# Patient Record
Sex: FEMALE | URBAN - METROPOLITAN AREA
[De-identification: names, ages, dates, MRNs, and addresses within clinical notes are randomized per-mention and may not be internally consistent; named-entity substitution may affect disease eponyms.]

---

## 2023-08-29 LAB
ESTIMATED AVERAGE GLUCOSE: NORMAL
HBA1C MFR BLD: 6.1 %

## 2024-01-26 ENCOUNTER — CLINICAL DOCUMENTATION (OUTPATIENT)
Dept: PHARMACY | Facility: CLINIC | Age: 59
End: 2024-01-26

## 2024-01-26 NOTE — PROGRESS NOTES
Pharmacy Pop Care Documentation:   Patient is missing the following requirements: DX: DM Type One or Type Two; Provider Documentation of DM Visit; A1C    If completed by 02/25/24 patient will be eligible for enrollment in the DM Program on 03/01/24.    Application Received: 1/26/24  Application scanned.    Letter mailed to patient.      Deondre Alba [facility]  Clinical Pharmacy   Phone: [phone]       For Pharmacy Admin Tracking Only    Program: [facility]  CPA in place:  No  Recommendation Provided To: Patient/Caregiver: 1 via Telephone  Intervention Detail: Scheduled Appointment  Intervention Accepted By: Patient/Caregiver: 1  Gap Closed?: Yes   Time Spent (min): 10

## 2024-02-06 ENCOUNTER — CLINICAL DOCUMENTATION (OUTPATIENT)
Dept: PHARMACY | Facility: CLINIC | Age: 59
End: 2024-02-06

## 2024-02-06 NOTE — PROGRESS NOTES
11/21/23: Brenda Zapata per scanned document.      Deondre Alba [facility]  Clinical Pharmacy   Phone: [phone]       For Pharmacy Admin Tracking Only    Program: [employer]  CPA in place:  No  Gap Closed?: Yes   Time Spent (min): 5

## 2024-03-01 ENCOUNTER — TELEPHONE (OUTPATIENT)
Dept: PHARMACY | Facility: CLINIC | Age: 59
End: 2024-03-01

## 2024-03-01 NOTE — TELEPHONE ENCOUNTER
**Patient is [employer]**     2024 Annual Pharmacist Visit    Called patient to schedule 2024 yearly pharmacist appointment to discuss medications for Diabetes Management Program.    Spoke to patient and appointment scheduled for 3/5/24 at 12:00pm CST (1:00pm EST)           Deondre Alba [facility]  Clinical Pharmacy   Phone: [phone], option #3       For Pharmacy Admin Tracking Only    Program: [employer]  CPA in place:  No  Recommendation Provided To: Patient/Caregiver: 1 via Telephone  Intervention Detail: Scheduled Appointment  Intervention Accepted By: Patient/Caregiver: 1  Gap Closed?: Yes   Time Spent (min): 5

## 2024-03-05 ENCOUNTER — TELEPHONE (OUTPATIENT)
Dept: PHARMACY | Facility: CLINIC | Age: 59
End: 2024-03-05

## 2024-03-05 LAB
ESTIMATED AVERAGE GLUCOSE: NORMAL
HBA1C MFR BLD: 6.3 %

## 2024-03-05 RX ORDER — BUPROPION HYDROCHLORIDE 75 MG/1
TABLET ORAL
COMMUNITY
Start: 2024-01-02

## 2024-03-05 RX ORDER — GABAPENTIN 300 MG/1
CAPSULE ORAL
COMMUNITY
Start: 2023-10-23

## 2024-03-05 RX ORDER — PANTOPRAZOLE SODIUM 40 MG/1
40 TABLET, DELAYED RELEASE ORAL DAILY
COMMUNITY
Start: 2023-04-06

## 2024-03-05 RX ORDER — ATORVASTATIN CALCIUM 40 MG/1
TABLET, FILM COATED ORAL
COMMUNITY
Start: 2023-09-26

## 2024-03-05 RX ORDER — AMLODIPINE BESYLATE 5 MG/1
TABLET ORAL
COMMUNITY
Start: 2023-09-26

## 2024-03-05 RX ORDER — ALPRAZOLAM 1 MG/1
TABLET ORAL
COMMUNITY
Start: 2024-02-14

## 2024-03-05 RX ORDER — LISINOPRIL 40 MG/1
TABLET ORAL
COMMUNITY
Start: 2023-10-24

## 2024-03-05 RX ORDER — TIRZEPATIDE 10 MG/.5ML
10 INJECTION, SOLUTION SUBCUTANEOUS
COMMUNITY
Start: 2023-09-03

## 2024-03-05 RX ORDER — LEVOTHYROXINE SODIUM 112 UG/1
112 TABLET ORAL EVERY MORNING
COMMUNITY
Start: 2024-01-04

## 2024-03-05 NOTE — TELEPHONE ENCOUNTER
[facility] CLINICAL PHARMACY REVIEW -LIVE WELL WITH DIABETES ([facility])  =================================================================  Jenny Braxton is a 59 y.o. female enrolled in the [facility] Live Well with Diabetes program.    Two attempts made to reach patient by telephone for pharmacist appointment. Left voice message for patient to return clinician's phone call to [phone], option 3.     Called multiple times between 1-2pm EST.  Phone went straight to  the first time, but I did get it to ring around 140.  Eventually went to .  Advised pt to call us back today and if I am available I will complete appt.    Of note, pt in [place] and cannot use [facility]    WAly Daily, PharmD, BCACP  Ambulatory Care Clinical Pharmacist- [facility] Clinical Pharmacy  Department, toll free: [phone]       For Pharmacy Admin Tracking Only    Program: [facility]  CPA in place:  No  Gap Closed?: No   Time Spent (min): 15

## 2024-05-08 ENCOUNTER — TELEPHONE (OUTPATIENT)
Dept: PHARMACY | Facility: CLINIC | Age: 59
End: 2024-05-08

## 2024-05-08 RX ORDER — ONDANSETRON 8 MG/1
8 TABLET, ORALLY DISINTEGRATING ORAL EVERY 8 HOURS PRN
COMMUNITY
Start: 2024-01-09

## 2024-05-08 RX ORDER — TIZANIDINE HYDROCHLORIDE 4 MG/1
4 CAPSULE, GELATIN COATED ORAL 3 TIMES DAILY
COMMUNITY
Start: 2023-11-06

## 2024-05-08 RX ORDER — CYANOCOBALAMIN 1000 UG/ML
1000 INJECTION, SOLUTION INTRAMUSCULAR; SUBCUTANEOUS ONCE
COMMUNITY
Start: 2024-03-15

## 2024-05-08 RX ORDER — UBIDECARENONE 100 MG
100 CAPSULE ORAL DAILY
COMMUNITY
Start: 2015-06-24

## 2024-05-08 NOTE — TELEPHONE ENCOUNTER
[facility] CLINICAL PHARMACY REVIEW - Live Well with Diabetes Program ([place])    Jenny Braxton is a 59 y.o. female enrolled in the [facility] Employee Diabetes Program. Patient provided writer with verbal consent to remain in the program for this year. Patient enrolled 2/29/24.    Pt located in [place] and cannot use [facility] HD    Email:  [email or web address]    Medications:  Current Outpatient Medications   Medication Instructions    ALPRAZolam (XANAX) 1 MG tablet TAKE 1 TABLET(1 MG) BY MOUTH THREE TIMES DAILY AS NEEDED FOR ANXIETY    amLODIPine (NORVASC) 5 MG tablet TAKE 1 TABLET(5 MG) BY MOUTH EVERY DAY    atorvastatin (LIPITOR) 40 MG tablet TAKE 1 TABLET(40 MG) BY MOUTH EVERY DAY    buPROPion (WELLBUTRIN) 75 MG tablet TAKE 1 TABLET BY MOUTH DAILY IN THE MORNING    coenzyme Q10 100 mg, Oral, DAILY    cyanocobalamin (DODEX) 1,000 mcg, IntraMUSCular, ONCE    DULoxetine (CYMBALTA) 60 mg, Oral, 2 TIMES DAILY    gabapentin (NEURONTIN) 300 MG capsule TAKE 1 CAPSULE BY MOUTH THREE TIMES DAILY FOR LEG OR NUMBNESS    levothyroxine (SYNTHROID) 112 mcg, Oral, EVERY MORNING    lisinopril (PRINIVIL;ZESTRIL) 40 MG tablet TAKE 1 TABLET(40 MG) BY MOUTH TWICE DAILY    Mounjaro 10 mg, SubCUTAneous, EVERY 7 DAYS    ondansetron (ZOFRAN-ODT) 8 mg, Oral, EVERY 8 HOURS PRN    pantoprazole (PROTONIX) 40 mg, Oral, DAILY    propranolol (INDERAL LA) 80 mg, Oral, DAILY    tiZANidine (ZANAFLEX) 4 mg, Oral, 3 TIMES DAILY    vitamin D (CHOLECALCIFEROL) 1,000 Units, Oral, DAILY     Pharmacy and Supplies Information  Current Pharmacy: [facility] - NPI [id number];  Discussed BIRDI in detail as well.  Would be able to use BIRDI and get 90ds at a time instead of 30 at [facility]    Current medications eligible for copay waiver, up to $600, through [facility] Copay reimbursement:  - Amlodipine, Atorvastatin, Levothyroxine, Lisinopril, Mounjaro  - Dexcom G7- if approved by insurance    Allergies:  Not on File   Vitals/Labs:  BP Readings from Last 3

## 2024-05-09 RX ORDER — DULOXETIN HYDROCHLORIDE 60 MG/1
60 CAPSULE, DELAYED RELEASE ORAL 2 TIMES DAILY
COMMUNITY

## 2024-05-09 RX ORDER — PROPRANOLOL HYDROCHLORIDE 80 MG/1
80 CAPSULE, EXTENDED RELEASE ORAL DAILY
COMMUNITY

## 2024-05-22 ENCOUNTER — CLINICAL DOCUMENTATION (OUTPATIENT)
Dept: PHARMACY | Facility: CLINIC | Age: 59
End: 2024-05-22

## 2024-05-22 NOTE — PROGRESS NOTES
1st Quarterly Reminder sent to patient for the DM Program - See Mychart message or Letter for more information.    Staci Chandra CphT  [facility]  Clinical Pharmacy   Department, toll free: [phone] Option #3      For Pharmacy Admin Tracking Only    Program: [employer]  CPA in place:  No  Gap Closed?: Yes   Time Spent (min): 5

## 2024-06-04 LAB
ESTIMATED AVERAGE GLUCOSE: NORMAL
HBA1C MFR BLD: 6.4 %

## 2024-08-09 LAB
CHOLESTEROL, TOTAL: 216 MG/DL
CHOLESTEROL/HDL RATIO: 4.8
CREATININE, URINE, EXTERNAL: 189
HDLC SERPL-MCNC: 45 MG/DL (ref 35–70)
LDL CHOLESTEROL: 141
MICROALBUMIN URINE, EXTERNAL: NORMAL
MICROALBUMIN/CREAT UR: 6.2 MG/G{CREAT}
NONHDLC SERPL-MCNC: NORMAL MG/DL
TRIGL SERPL-MCNC: 149 MG/DL
VLDLC SERPL CALC-MCNC: NORMAL MG/DL

## 2024-08-16 ENCOUNTER — TELEPHONE (OUTPATIENT)
Dept: PHARMACY | Facility: CLINIC | Age: 59
End: 2024-08-16

## 2024-08-16 NOTE — TELEPHONE ENCOUNTER
[facility] Employee Diabetes Program - Live Well With Diabetes    Quarterly Check-in  - Sent the following mychart/letter to patient  - Salom[place] Olivia updated    Congratulations! You have completed all of the requirements needed to maintain enrollment in the Live Well With Diabetes Management program for 2024 and will be automatically re-enrolled for 2025.     Add labs 8/9/24:    Component 08/09/2024       Cholesterol 216 High       Triglycerides 149   HDL Cholesterol 45   Total Cholesterol/HDL Ratio 4.80 High       Non HDL Cholesterol 171 High        LDL Cholesterol 141 High         Urine micro creat, ur 189  Micro/creat ration 6.2    [facility] Population Health Pharmacy   Phone: [phone] Option #3  Email: [email or web address]  Fax Number: [phone]     For Pharmacy Admin Tracking Only    Program: [employer]  Time Spent (min): 5

## 2025-02-17 ENCOUNTER — TELEPHONE (OUTPATIENT)
Dept: PHARMACY | Facility: CLINIC | Age: 60
End: 2025-02-17

## 2025-02-17 NOTE — TELEPHONE ENCOUNTER
**Patient is [employer]**  Called patient to schedule 2025 yearly pharmacist appointment to discuss medications for Diabetes Management Program.    Spoke to patient and she no longer has [employer] benefits Insurance and would like to be removed from the DM Program      Mikel Lynch [facility]  Clinical Pharmacy   Department, toll free: [phone] Option #3      For Pharmacy Admin Tracking Only    Program: [employer]  CPA in place:  No  Gap Closed?: No   Time Spent (min): 5

## 2025-02-17 NOTE — TELEPHONE ENCOUNTER
Noted. Patient will be discharged from the DM Program.    Staci Chandra [facility]   Population Health Clinical   [facility] Clinical Pharmacy  Department, toll free: [phone], option 3

## 2025-02-18 ENCOUNTER — CLINICAL DOCUMENTATION (OUTPATIENT)
Dept: PHARMACY | Facility: CLINIC | Age: 60
End: 2025-02-18

## 2025-02-18 NOTE — PROGRESS NOTES
Pharmacy Pop Care Documentation:     Jenny Braxton is being removed from the diabetes management program for the following reason(s):  per 2/17/25 Encounter: Patient reports she no longer has [employer] Benefits and requests discharge from the DM Program    Staci Chandra    For Pharmacy Admin Tracking Only    Program: [facility]  CPA in place:  No  Gap Closed?: Yes   Time Spent (min): 5

## 2025-07-07 ENCOUNTER — TELEPHONE (OUTPATIENT)
Dept: PHARMACY | Facility: CLINIC | Age: 60
End: 2025-07-07

## 2025-07-07 NOTE — TELEPHONE ENCOUNTER
**Patient called in and stated she has [employer] benefits again and would like to re-enroll into program. Confirmed patient email and sent application.**      Pharmacy Pop Care Documentation:   Patient is missing the following requirements: DM Program Application    If completed by 07/25/25 patient will be eligible for re-enrollment in the DM Program on 08/01/25.    Emailed patient application to [email or web address].      Zan Arredondo CPhT  [facility]  Clinical Pharmacy   Phone: [phone], Option #3     For Pharmacy Admin Tracking Only    Program: Pop Health  CPA in place:  No  Gap Closed?: No   Time Spent (min): 10

## 2025-07-28 ENCOUNTER — TELEPHONE (OUTPATIENT)
Dept: PHARMACY | Facility: CLINIC | Age: 60
End: 2025-07-28

## 2025-07-30 ENCOUNTER — TELEPHONE (OUTPATIENT)
Dept: PHARMACY | Facility: CLINIC | Age: 60
End: 2025-07-30

## 2025-07-30 RX ORDER — ATORVASTATIN CALCIUM 20 MG/1
20 TABLET, FILM COATED ORAL DAILY
COMMUNITY

## 2025-07-30 RX ORDER — TIRZEPATIDE 7.5 MG/.5ML
7.5 INJECTION, SOLUTION SUBCUTANEOUS WEEKLY
COMMUNITY

## 2025-07-30 RX ORDER — RIMEGEPANT SULFATE 75 MG/75MG
TABLET, ORALLY DISINTEGRATING ORAL
COMMUNITY
Start: 2025-07-21

## 2025-07-30 NOTE — TELEPHONE ENCOUNTER
07/01/2025):  Yes - Provider Visit for DM (1st)  Yes- A1c (1st)    Ongoing Program Requirements (Y indicates has completed for the year, N indicates needs to be completed by 12/31/2025):  No - Provider Visit for DM (2nd)  N/A - ACC/diabetes educator visit (if A1c over 8%)   No - A1c (2nd)  Yes - Lipid Panel  No - Urine Albumin/Creatinine ratio  Yes - Medication adherence over 70%  Yes - On statin or contraindication(s)- Atorvastatin 20mg  Yes - On ACEi/ARB or contraindication(s)- Lisinopril 40mg       Diabetes Care:   Glycemic Goal: <7.0%. Stable and at blood glucose goal. Type 2 DM under excellent control.  Home blood sugar records:  Patient does test frequently due to random lows, feeling of lows.  Hard to find time to test frequently.  Readings are generally within range.  She is getting ready to resume using Dexcom G7. Her provider appealed it recently and it was overturned.  She plans to fill in on 8/1 when her benefit starts.  The patient was instructed on how to use the Dexcom G7, including sensor placement, care, and replacing it on time. They were taught how to read glucose numbers and alerts and what to do if blood sugar is too high or low. Important CGM terms were explained: Time in Range (TIR) shows how often blood sugar stays in target, Glucose Management Indicator (GMI) estimates A1c from CGM data, and estimated Average Glucose (eAG) shows average blood sugar. The patient was advised to keep the device charged and synced and to use the data to manage diabetes with their provider.   Any episodes of hypoglycemia?- yespatient had what she thinks were symptomatic lows.  Had an episode of syncope and ended up in ED. She said they ruled most everything out, but could not rule out a low BG.  Recommended that she keep a fast acting sugar supply available prn, especially if she does not have her meter to confirm a low.  Current symptoms/problems include none  Known diabetic complications: none  Eye exam current